# Patient Record
Sex: FEMALE | Race: WHITE | URBAN - METROPOLITAN AREA
[De-identification: names, ages, dates, MRNs, and addresses within clinical notes are randomized per-mention and may not be internally consistent; named-entity substitution may affect disease eponyms.]

---

## 2023-06-09 ENCOUNTER — OFFICE VISIT (OUTPATIENT)
Dept: URBAN - METROPOLITAN AREA CLINIC 3 | Facility: CLINIC | Age: 18
End: 2023-06-09
Payer: COMMERCIAL

## 2023-06-09 DIAGNOSIS — H43.313 VITREOUS MEMBRANES AND STRANDS, BILATERAL: ICD-10-CM

## 2023-06-09 DIAGNOSIS — H04.123 DRY EYE SYNDROME OF BILATERAL LACRIMAL GLANDS: ICD-10-CM

## 2023-06-09 DIAGNOSIS — C95.91: Primary | ICD-10-CM

## 2023-06-09 PROCEDURE — 99204 OFFICE O/P NEW MOD 45 MIN: CPT | Performed by: OPTOMETRIST

## 2023-06-09 ASSESSMENT — INTRAOCULAR PRESSURE
OS: 14
OD: 18

## 2023-06-09 NOTE — IMPRESSION/PLAN
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 Plan: History of leukemia in remission. No ophthalmic etiology. Recommend to continue follow up with PCP and oncologist. Will continue to monitor.